# Patient Record
Sex: MALE | Race: WHITE | NOT HISPANIC OR LATINO | Employment: FULL TIME | URBAN - METROPOLITAN AREA
[De-identification: names, ages, dates, MRNs, and addresses within clinical notes are randomized per-mention and may not be internally consistent; named-entity substitution may affect disease eponyms.]

---

## 2022-06-18 ENCOUNTER — HOSPITAL ENCOUNTER (EMERGENCY)
Facility: HOSPITAL | Age: 32
Discharge: HOME | End: 2022-06-18
Attending: EMERGENCY MEDICINE | Admitting: EMERGENCY MEDICINE

## 2022-06-18 VITALS
HEART RATE: 72 BPM | DIASTOLIC BLOOD PRESSURE: 80 MMHG | RESPIRATION RATE: 20 BRPM | TEMPERATURE: 98.4 F | SYSTOLIC BLOOD PRESSURE: 136 MMHG | OXYGEN SATURATION: 98 %

## 2022-06-18 DIAGNOSIS — K08.89 PAIN, DENTAL: Primary | ICD-10-CM

## 2022-06-18 PROCEDURE — 99282 EMERGENCY DEPT VISIT SF MDM: CPT

## 2022-06-18 PROCEDURE — 99284 EMERGENCY DEPT VISIT MOD MDM: CPT | Performed by: PHYSICIAN ASSISTANT

## 2022-06-18 RX ORDER — TRAMADOL HYDROCHLORIDE 50 MG/1
50 TABLET ORAL EVERY 6 HOURS PRN
Qty: 20 TABLET | Refills: 0 | Status: SHIPPED | OUTPATIENT
Start: 2022-06-18 | End: 2022-06-18 | Stop reason: SDUPTHER

## 2022-06-18 RX ORDER — TRAMADOL HYDROCHLORIDE 50 MG/1
50 TABLET ORAL EVERY 6 HOURS PRN
Qty: 20 TABLET | Refills: 0 | Status: SHIPPED | OUTPATIENT
Start: 2022-06-18 | End: 2022-06-28

## 2022-06-18 RX ORDER — PENICILLIN V POTASSIUM 500 MG/1
500 TABLET ORAL 3 TIMES DAILY
Qty: 30 TABLET | Refills: 0 | Status: SHIPPED | OUTPATIENT
Start: 2022-06-18 | End: 2022-06-28

## 2022-06-18 RX ORDER — PENICILLIN V POTASSIUM 500 MG/1
500 TABLET ORAL 3 TIMES DAILY
Qty: 30 TABLET | Refills: 0 | Status: SHIPPED | OUTPATIENT
Start: 2022-06-18 | End: 2022-06-18 | Stop reason: SDUPTHER

## 2022-06-18 NOTE — ED PROVIDER NOTES
History  Chief Complaint   Patient presents with    Dental Pain     Upper right tooth "a couple of days ago" and bottom left tooth "for a while " No pain meds PTA, has tried Orajel       27 y o  male  with no significant past medical history presents to ED with chief complaint of dental pain  Onset reported as 3-4  days ago  Location of symptoms is reported as left lower posterior molar and right upper posterior molar  Quality is reported as throbbing pain  Severity is reported as moderate  Associated symptoms: denies headaches, denies fevers, denies dysphagia, denies dysphonia, denies drooling, denies facial swelling, denies rash  Modifiers:  Chewing and eating exacerbates pain  Context: medical summary: review of past visit history via EPIC demonstrates no prior visits to this ed    Medical decision making:  I reviewed this patient through the [facility] portal and did not find any evidence of narcotic abuse or doctor shopping  History provided by:  Patient and significant other   used: No        None       History reviewed  No pertinent past medical history  History reviewed  No pertinent surgical history  History reviewed  No pertinent family history  I have reviewed and agree with the history as documented  E-Cigarette/Vaping    E-Cigarette Use Never User      E-Cigarette/Vaping Substances     Social History     Tobacco Use    Smoking status: Current Every Day Smoker     Packs/day: 0 25     Types: Cigarettes    Smokeless tobacco: Never Used   Vaping Use    Vaping Use: Never used   Substance Use Topics    Alcohol use: Yes     Comment: socially    Drug use: Never       Review of Systems   Constitutional: Negative for activity change, appetite change, chills, diaphoresis, fatigue, fever and unexpected weight change  HENT: Positive for dental problem   Negative for congestion, drooling, ear discharge, ear pain, facial swelling, hearing loss, mouth sores, nosebleeds, postnasal drip, rhinorrhea, sinus pressure, sinus pain, sneezing, sore throat, tinnitus, trouble swallowing and voice change  Eyes: Negative for photophobia, pain, discharge, redness, itching and visual disturbance  Respiratory: Negative for cough, choking, chest tightness, shortness of breath, wheezing and stridor  Cardiovascular: Negative for chest pain, palpitations and leg swelling  Gastrointestinal: Negative for abdominal distention, abdominal pain, anal bleeding, blood in stool, constipation, diarrhea, nausea, rectal pain and vomiting  Endocrine: Negative for cold intolerance, heat intolerance, polydipsia, polyphagia and polyuria  Genitourinary: Negative for difficulty urinating, dysuria, flank pain, frequency, hematuria and urgency  Musculoskeletal: Negative for arthralgias, back pain, gait problem, joint swelling, myalgias, neck pain and neck stiffness  Skin: Negative for color change, pallor, rash and wound  Allergic/Immunologic: Negative for environmental allergies, food allergies and immunocompromised state  Neurological: Negative for dizziness, tremors, seizures, syncope, facial asymmetry, speech difficulty, weakness, light-headedness, numbness and headaches  Hematological: Negative for adenopathy  Does not bruise/bleed easily  Psychiatric/Behavioral: Negative for agitation, confusion and hallucinations  The patient is not nervous/anxious  All other systems reviewed and are negative  Physical Exam  Physical Exam  Vitals and nursing note reviewed  Constitutional:       General: He is not in acute distress  Appearance: Normal appearance  Comments: /80 (BP Location: Left arm)   Pulse 72   Temp 98 4 °F (36 9 °C) (Oral)   Resp 20   SpO2 98%      HENT:      Head: Normocephalic and atraumatic        Right Ear: External ear normal       Left Ear: External ear normal       Nose: Nose normal       Mouth/Throat:      Comments: ears appear normal  external auditory canals patent without erythema or edema bilaterally  TM grey/flat bilaterally  nose normal inspection, no deformity, nares patent bilaterally  no septal hematoma, no epistaxis  mucous membranes moist, pink  tongue midline without edema  uvula midline without deviation  posterior pharynx widely patent  no posterior erythema  tonsils without edema, erythema or purulent exudate  no tongue or lip swelling present  The left lower posterior molar and right upper posterior molar has an area of erythema and enamel erosion  No defined dental abscess  No sublingual or submandibular fullness or swelling  No trismus  No drooling or pooling of secretions  Eyes:      General: No scleral icterus  Right eye: No discharge  Left eye: No discharge  Conjunctiva/sclera: Conjunctivae normal    Cardiovascular:      Rate and Rhythm: Normal rate and regular rhythm  Pulses: Normal pulses  Pulmonary:      Effort: Pulmonary effort is normal  No respiratory distress  Musculoskeletal:         General: No tenderness, deformity or signs of injury  Normal range of motion  Cervical back: Normal range of motion and neck supple  No rigidity or tenderness  Skin:     Capillary Refill: Capillary refill takes less than 2 seconds  Coloration: Skin is not jaundiced or pale  Findings: No erythema or rash  Neurological:      General: No focal deficit present  Mental Status: He is alert and oriented to person, place, and time  Mental status is at baseline  Cranial Nerves: No cranial nerve deficit  Sensory: No sensory deficit  Motor: No weakness  Psychiatric:         Mood and Affect: Mood normal          Behavior: Behavior normal          Thought Content:  Thought content normal          Judgment: Judgment normal          Vital Signs  ED Triage Vitals [06/18/22 1444]   Temperature Pulse Respirations Blood Pressure SpO2   98 4 °F (36 9 °C) 72 20 136/80 98 % Temp Source Heart Rate Source Patient Position - Orthostatic VS BP Location FiO2 (%)   Oral Monitor Sitting Left arm --      Pain Score       7           Vitals:    06/18/22 1444   BP: 136/80   Pulse: 72   Patient Position - Orthostatic VS: Sitting         Visual Acuity      ED Medications  Medications - No data to display    Diagnostic Studies  Results Reviewed     None                 No orders to display              Procedures  Procedures         ED Course                                             MDM  Number of Diagnoses or Management Options  Pain, dental: minor  Diagnosis management comments: MDM   Differential diagnosis includes but is not limited to dental fracture, tooth subluxation, tooth avulsion, dry socket,  dental abscess, consider but doubt Osei's angina or submandibular infection  Discussed with patient diagnosis of dental pain  Discussed prophylactic treatment with penicillin for antibiotic coverage  Will treat with analgesic pain medication  Standard narcotic precautions were given  Follow up with primary care physician and dentist as soon as possible was discussed  Referrals were given  Reviewed reasons to return to the emergency department  Amount and/or Complexity of Data Reviewed  Obtain history from someone other than the patient: yes (Significant other)  Review and summarize past medical records: yes    Risk of Complications, Morbidity, and/or Mortality  General comments: Disclaimers:    I have reasonably determine that electronically prescribing a controlled substance would be impractical for the patient to obtain the controlled substance prescribed by electronic prescription or would cause an untimely delay resulting in an adverse impact on the patient's medical condition        Patient was seen during the outbreak of the corona virus epidemic   Resources are limited due to the severity of patient illnesses associated with virus   Testing is also limited at this time   Discussed with patient at the time of this evaluation   Due to the fact that limited resources are available -treatment options are limited  Patient Progress  Patient progress: stable      Disposition  Final diagnoses:   Pain, dental     Time reflects when diagnosis was documented in both MDM as applicable and the Disposition within this note     Time User Action Codes Description Comment    6/18/2022  3:24 PM Priti Thomason Add [K08 89] Pain, dental       ED Disposition     ED Disposition   Discharge    Condition   Stable    Date/Time   Sat Jun 18, 2022  3:24 PM    Comment   Christos Tanner discharge to home/self care  Follow-up Information     Follow up With Specialties Details Why Contact Info Additional [address] Emergency Department Emergency Medicine Go to  If symptoms worsen [address] [facility] [address] [facility] Emergency Department, [address], [place], [place], [address] Adult and [address] [facility] [address]  Call in 2 days for further evaluation of symptoms [address] [facility] [address]  [phone]           Current Discharge Medication List      START taking these medications    Details   penicillin V potassium (VEETID) 500 mg tablet Take 1 tablet (500 mg total) by mouth 3 (three) times a day for 10 days  Qty: 30 tablet, Refills: 0    Associated Diagnoses: Pain, dental      traMADol (Ultram) 50 mg tablet Take 1 tablet (50 mg total) by mouth every 6 (six) hours as needed for moderate pain (dental pain/initial rx ) for up to 10 days  Qty: 20 tablet, Refills: 0    Associated Diagnoses: Pain, dental             No discharge procedures on file      PDMP Review       Value Time User    PDMP Reviewed  Yes 6/18/2022  3:37 PM Prince Cook PA-C          ED Provider  Electronically Signed by           Prince Cook PA-C  06/18/22 26 [id number]

## 2023-12-15 ENCOUNTER — HOSPITAL ENCOUNTER (EMERGENCY)
Facility: HOSPITAL | Age: 33
Discharge: HOME | End: 2023-12-16
Attending: EMERGENCY MEDICINE

## 2023-12-15 VITALS
RESPIRATION RATE: 18 BRPM | HEIGHT: 68 IN | OXYGEN SATURATION: 97 % | TEMPERATURE: 98.1 F | BODY MASS INDEX: 41.7 KG/M2 | HEART RATE: 84 BPM | DIASTOLIC BLOOD PRESSURE: 67 MMHG | WEIGHT: 275.13 LBS | SYSTOLIC BLOOD PRESSURE: 131 MMHG

## 2023-12-15 DIAGNOSIS — K04.7 PERIAPICAL ABSCESS: Primary | ICD-10-CM

## 2023-12-15 PROCEDURE — 99282 EMERGENCY DEPT VISIT SF MDM: CPT

## 2023-12-16 PROCEDURE — 99284 EMERGENCY DEPT VISIT MOD MDM: CPT

## 2023-12-16 RX ORDER — PENICILLIN V POTASSIUM 250 MG/1
500 TABLET ORAL ONCE
Status: COMPLETED | OUTPATIENT
Start: 2023-12-16 | End: 2023-12-16

## 2023-12-16 RX ORDER — LIDOCAINE HYDROCHLORIDE 20 MG/ML
15 SOLUTION OROPHARYNGEAL ONCE
Status: COMPLETED | OUTPATIENT
Start: 2023-12-16 | End: 2023-12-16

## 2023-12-16 RX ORDER — PENICILLIN V POTASSIUM 500 MG/1
500 TABLET ORAL 4 TIMES DAILY
Qty: 28 TABLET | Refills: 0 | Status: SHIPPED | OUTPATIENT
Start: 2023-12-16 | End: 2023-12-23

## 2023-12-16 RX ADMIN — PENICILLIN V POTASSIUM 500 MG: 250 TABLET, FILM COATED ORAL at 00:14

## 2023-12-16 RX ADMIN — LIDOCAINE HYDROCHLORIDE 15 ML: 20 SOLUTION ORAL; TOPICAL at 00:17

## 2023-12-16 NOTE — ED NOTES
Patient reassessed in the waiting room, no change in patient. RN apologized for wait time and offered pt drink.  No needs at this time     Cristina Corado RN  12/15/23 5577

## 2023-12-16 NOTE — DISCHARGE INSTRUCTIONS
Schedule an appointment with the dental clinic as soon as possible. Take the prescribed antibiotic as directed for the full duration of its course. Return to the ER if you develop fever, new or worsening pain, worsening swelling, facial swelling or inability to open and close mouth, difficulty swallowing, vomiting, neck stiffness, difficulty breathing, weakness, confusion, or lethargy.

## 2023-12-16 NOTE — ED PROVIDER NOTES
History  Chief Complaint   Patient presents with    Dental Problem     Patient comes in reporting R upper dental issue. States he noticed swelling/ bump today on right upper side of mouth. Denies pain, denies fevers. The patient is a 39-year-old male with no known PMH presenting for evaluation of bump to the gumline of the right upper tooth. The patient notes this morning starting with a bump to his gums at an area where he has had a cracked tooth. He notes it is tender when touched otherwise he has no pain. He denies fevers. He denies recent dental work. He notes he has had no insurance for which it has been difficult to see a dentist regularly. He denies lip or tongue swelling, headaches, facial swelling, neck pain, neck stiffness, sore throat, painful swallowing, trismus, nausea, and vomiting. None       History reviewed. No pertinent past medical history. History reviewed. No pertinent surgical history. History reviewed. No pertinent family history. I have reviewed and agree with the history as documented. E-Cigarette/Vaping    E-Cigarette Use Never User      E-Cigarette/Vaping Substances     Social History     Tobacco Use    Smoking status: Every Day     Current packs/day: 0.25     Types: Cigarettes    Smokeless tobacco: Never   Vaping Use    Vaping status: Never Used   Substance Use Topics    Alcohol use: Yes     Comment: socially    Drug use: Never       Review of Systems   Constitutional:  Negative for chills and fever. HENT:  Positive for dental problem (Concern for infection of right upper tooth that is cracked). Negative for congestion, drooling, ear pain, facial swelling, sore throat, trouble swallowing and voice change. Respiratory:  Negative for cough and shortness of breath. Cardiovascular:  Negative for chest pain. Gastrointestinal:  Negative for abdominal pain, nausea and vomiting. All other systems reviewed and are negative.       Physical Exam  Physical Exam  Vitals and nursing note reviewed. Constitutional:       General: He is not in acute distress. Appearance: Normal appearance. He is well-developed. He is not ill-appearing, toxic-appearing or diaphoretic. HENT:      Head: Normocephalic and atraumatic. Jaw: There is normal jaw occlusion. No trismus, swelling, pain on movement or malocclusion. Nose: Nose normal.      Mouth/Throat:      Lips: Pink. Mouth: Mucous membranes are moist. No injury. Dentition: Abnormal dentition. Gingival swelling, dental caries and dental abscesses present. No dental tenderness or gum lesions. Tongue: No lesions. Palate: No lesions. Pharynx: Oropharynx is clear. Uvula midline. Tonsils: No tonsillar exudate or tonsillar abscesses. Comments: Dental caries throughout, cracked right upper molar with surrounding periapical abscess that is fluctuant and tender to touch. No abscess or swelling to the hard palate. Oropharynx clear without erythema or swelling. No cervical lymphadenopathy. Eyes:      Extraocular Movements: Extraocular movements intact. Conjunctiva/sclera: Conjunctivae normal.   Neck:      Trachea: Phonation normal. No abnormal tracheal secretions. Cardiovascular:      Rate and Rhythm: Normal rate. Pulmonary:      Effort: Pulmonary effort is normal. No respiratory distress. Musculoskeletal:         General: Normal range of motion. Cervical back: Full passive range of motion without pain, normal range of motion and neck supple. Lymphadenopathy:      Cervical: No cervical adenopathy. Skin:     General: Skin is warm and dry. Capillary Refill: Capillary refill takes less than 2 seconds. Neurological:      General: No focal deficit present. Mental Status: He is alert and oriented to person, place, and time. Mental status is at baseline.          Vital Signs  ED Triage Vitals [12/15/23 2230]   Temperature Pulse Respirations Blood Pressure SpO2 98.1 °F (36.7 °C) 98 18 156/73 98 %      Temp Source Heart Rate Source Patient Position - Orthostatic VS BP Location FiO2 (%)   Oral Monitor;Right Sitting Right arm --      Pain Score       --           Vitals:    12/15/23 2230 12/15/23 2350   BP: 156/73 131/67   Pulse: 98 84   Patient Position - Orthostatic VS: Sitting Sitting         Visual Acuity      ED Medications  Medications   Lidocaine Viscous HCl (XYLOCAINE) 2 % mucosal solution 15 mL (15 mL Swish & Spit Given 12/16/23 0017)   penicillin V potassium (VEETID) tablet 500 mg (500 mg Oral Given 12/16/23 0014)       Diagnostic Studies  Results Reviewed       None                   No orders to display              Procedures  Incision and drain    Date/Time: 12/16/2023 12:55 AM    Performed by: Rodri Encarnacion Prost  Authorized by: ETHAN Encarnacion  Universal Protocol:  Consent: Verbal consent obtained. Risks and benefits: risks, benefits and alternatives were discussed  Consent given by: patient  Time out: Immediately prior to procedure a "time out" was called to verify the correct patient, procedure, equipment, support staff and site/side marked as required. Timeout called at: 12/16/2023 12:15 AM.  Patient understanding: patient states understanding of the procedure being performed  Imaging studies available: NA.  Patient identity confirmed: verbally with patient    Patient location:  ED  Location:     Type:  Abscess    Location:  Mouth    Location Detail:  Intraoral    Mouth location: periapical abscess. Anesthesia (see MAR for exact dosages): Anesthesia method:  Topical application  Procedure details:     Complexity:  Simple    Needle aspiration: no      Incision types:  Stab incision    Scalpel size: 18g needle.     Approach:  Puncture    Incision depth:  Submucosal    Irrigation with saline:  Swish and spit with water    Drainage:  Purulent and bloody    Drainage amount:  Scant    Wound treatment:  Wound left open    Packing materials: None  Post-procedure details:     Patient tolerance of procedure: Tolerated well, no immediate complications           ED Course  ED Course as of 12/16/23 0105   Fri Dec 15, 2023   2359 Triage vital signs within normal limits and stable   Sat Dec 16, 2023   0015 Focal area of erythema, fluctuance, tenderness identified to the lateral right upper molar tooth that is cracked. He has no red flag symptoms of facial swelling, trismus, sore throat, difficulty swallowing, or neck stiffness. No hoarse or hot potato voice. Plan made with patient for topical application of lidocaine for analgesia prior to I&D. Will give first dose of antibiotics here. 4239 Patient notes adequate relief with topical lidocaine analgesia. Stab incision made with 18-gauge needle with spontaneous release of scant amount of purulent drainage as well as sanguinous drainage. Patient tolerated without difficulty. Area of abscess milked with no further drainage. Patient given first dose of penicillin V potassium here. Patient to continue 4 times daily for the next 7 days. Referral to dentistry as well as number for dental clinic provided. He is aware of importance of follow-up with dentistry. He has no further questions at this time. He appears well, in no acute distress, and is ambulatory with steady gait at time discharge. SBIRT 20yo+      Flowsheet Row Most Recent Value   Initial Alcohol Screen: US AUDIT-C     1. How often do you have a drink containing alcohol? 0 Filed at: 12/15/2023 2232   2. How many drinks containing alcohol do you have on a typical day you are drinking? 0 Filed at: 12/15/2023 2232   3a. Male UNDER 65: How often do you have five or more drinks on one occasion? 0 Filed at: 12/15/2023 2232   3b. FEMALE Any Age, or MALE 65+: How often do you have 4 or more drinks on one occassion?  0 Filed at: 12/15/2023 2232   Audit-C Score 0 Filed at: 12/15/2023 2232   ISAIAH: How many times in the past year have you. .. Used an illegal drug or used a prescription medication for non-medical reasons? Never Filed at: 12/15/2023 1801                      Medical Decision Making  DDx including but not limited to: Dental carry, dental infection including periapical abscess, gingivitis; consider but doubt Osei's angina, peritonsillar abscess, pharyngitis, or deep space infection    See ED course for further MDM and disposition discussion. Problems Addressed:  Periapical abscess: acute illness or injury    Amount and/or Complexity of Data Reviewed  Independent Historian: spouse    Risk  OTC drugs. Prescription drug management. Disposition  Final diagnoses:   Periapical abscess     Time reflects when diagnosis was documented in both MDM as applicable and the Disposition within this note       Time User Action Codes Description Comment    12/16/2023 12:09 AM Lore Felix Add [K04.7] Periapical abscess           ED Disposition       ED Disposition   Discharge    Condition   Stable    Date/Time   Sat Dec 16, 2023 [facility] discharge to home/self care.                    Follow-up Information       Follow up With Specialties Details Why Contact Info Additional Information    [facility]  Call on 12/25/2023  [address] [place] [address] Emergency Department Emergency Medicine Go to  If symptoms worsen [address] [facility] Emergency Department, [address] [place] [address], [address]            Patient's Medications   Discharge Prescriptions    PENICILLIN V POTASSIUM (VEETID) 500 MG TABLET    Take 1 tablet (500 mg total) by mouth 4 (four) times a day for 7 days       Start Date: 12/16/2023End Date: 12/23/2023       Order Dose: 500 mg       Quantity: 28 tablet    Refills: 0           PDMP Review         Value Time User    PDMP Reviewed  Yes 6/18/2022  3:37 PM Lavon March PA-C            ED Provider  Electronically Signed by             ETHAN Augustin  12/16/23 0105

## 2025-01-31 ENCOUNTER — APPOINTMENT (OUTPATIENT)
Dept: URGENT CARE | Age: 35
End: 2025-01-31

## 2025-01-31 ENCOUNTER — LAB (OUTPATIENT)
Dept: LAB | Age: 35
End: 2025-01-31

## 2025-01-31 DIAGNOSIS — Z00.00 EXAMINATION: ICD-10-CM

## 2025-01-31 PROCEDURE — 86480 TB TEST CELL IMMUN MEASURE: CPT

## 2025-01-31 PROCEDURE — 36415 COLL VENOUS BLD VENIPUNCTURE: CPT

## 2025-02-01 LAB
GAMMA INTERFERON BACKGROUND BLD IA-ACNC: 0.01 IU/ML
M TB IFN-G BLD-IMP: NEGATIVE
M TB IFN-G CD4+ BCKGRND COR BLD-ACNC: 0.04 IU/ML
M TB IFN-G CD4+ BCKGRND COR BLD-ACNC: 0.05 IU/ML
MITOGEN IGNF BCKGRD COR BLD-ACNC: 9.99 IU/ML